# Patient Record
Sex: MALE | Race: WHITE
[De-identification: names, ages, dates, MRNs, and addresses within clinical notes are randomized per-mention and may not be internally consistent; named-entity substitution may affect disease eponyms.]

---

## 2020-11-11 ENCOUNTER — HOSPITAL ENCOUNTER (EMERGENCY)
Dept: HOSPITAL 94 - ER | Age: 80
Discharge: HOME | End: 2020-11-11
Payer: COMMERCIAL

## 2020-11-11 VITALS — SYSTOLIC BLOOD PRESSURE: 139 MMHG | DIASTOLIC BLOOD PRESSURE: 86 MMHG

## 2020-11-11 VITALS — WEIGHT: 190.39 LBS | HEIGHT: 68 IN | BODY MASS INDEX: 28.85 KG/M2

## 2020-11-11 DIAGNOSIS — N39.0: Primary | ICD-10-CM

## 2020-11-11 DIAGNOSIS — I25.10: ICD-10-CM

## 2020-11-11 DIAGNOSIS — I10: ICD-10-CM

## 2020-11-11 DIAGNOSIS — R53.1: ICD-10-CM

## 2020-11-11 DIAGNOSIS — Z79.899: ICD-10-CM

## 2020-11-11 DIAGNOSIS — Z88.0: ICD-10-CM

## 2020-11-11 DIAGNOSIS — Z95.1: ICD-10-CM

## 2020-11-11 DIAGNOSIS — Z85.46: ICD-10-CM

## 2020-11-11 LAB
ALBUMIN SERPL BCP-MCNC: 3.4 G/DL (ref 3.4–5)
ALBUMIN/GLOB SERPL: 0.9 {RATIO} (ref 1.1–1.5)
ALP SERPL-CCNC: 66 IU/L (ref 46–116)
ALT SERPL W P-5'-P-CCNC: 16 U/L (ref 12–78)
ANION GAP SERPL CALCULATED.3IONS-SCNC: 9 MMOL/L (ref 8–16)
AST SERPL W P-5'-P-CCNC: 10 U/L (ref 10–37)
BACTERIA URNS QL MICRO: (no result) /HPF
BASOPHILS # BLD AUTO: 0.1 X10'3 (ref 0–0.2)
BASOPHILS NFR BLD AUTO: 0.7 % (ref 0–1)
BILIRUB SERPL-MCNC: 1.2 MG/DL (ref 0.1–1)
BUN SERPL-MCNC: 16 MG/DL (ref 7–18)
BUN/CREAT SERPL: 9.6 (ref 5.4–32)
CALCIUM SERPL-MCNC: 8.8 MG/DL (ref 8.5–10.1)
CHLORIDE SERPL-SCNC: 104 MMOL/L (ref 99–107)
CLARITY UR: (no result)
CO2 SERPL-SCNC: 24.9 MMOL/L (ref 24–32)
COLOR UR: YELLOW
CREAT SERPL-MCNC: 1.67 MG/DL (ref 0.6–1.1)
DEPRECATED SQUAMOUS URNS QL MICRO: (no result) /LPF
EOSINOPHIL # BLD AUTO: 0 X10'3 (ref 0–0.9)
EOSINOPHIL NFR BLD AUTO: 0.1 % (ref 0–6)
ERYTHROCYTE [DISTWIDTH] IN BLOOD BY AUTOMATED COUNT: 13.8 % (ref 11.5–14.5)
GFR SERPL CREATININE-BSD FRML MDRD: 40 ML/MIN
GLUCOSE SERPL-MCNC: 139 MG/DL (ref 70–104)
GLUCOSE UR STRIP-MCNC: NEGATIVE MG/DL
HCT VFR BLD AUTO: 37.8 % (ref 42–52)
HGB BLD-MCNC: 12.7 G/DL (ref 14–17.9)
HGB UR QL STRIP: (no result)
KETONES UR STRIP-MCNC: NEGATIVE MG/DL
LEUKOCYTE ESTERASE UR QL STRIP: (no result)
LIPASE SERPL-CCNC: 73 U/L (ref 73–393)
LYMPHOCYTES # BLD AUTO: 0.7 X10'3 (ref 1.1–4.8)
LYMPHOCYTES NFR BLD AUTO: 5.7 % (ref 21–51)
MCH RBC QN AUTO: 30.6 PG (ref 27–31)
MCHC RBC AUTO-ENTMCNC: 33.7 G/DL (ref 33–36.5)
MCV RBC AUTO: 90.9 FL (ref 78–98)
MONOCYTES # BLD AUTO: 1.3 X10'3 (ref 0–0.9)
MONOCYTES NFR BLD AUTO: 11.4 % (ref 2–12)
MUCOUS THREADS URNS QL MICRO: (no result) /LPF
NEUTROPHILS # BLD AUTO: 9.4 X10'3 (ref 1.8–7.7)
NEUTROPHILS NFR BLD AUTO: 82.1 % (ref 42–75)
NITRITE UR QL STRIP: NEGATIVE
PH UR STRIP: 7 [PH] (ref 4.8–8)
PLATELET # BLD AUTO: 193 X10'3 (ref 140–440)
PMV BLD AUTO: 7.3 FL (ref 7.4–10.4)
POTASSIUM SERPL-SCNC: 4.1 MMOL/L (ref 3.5–5.1)
PROT SERPL-MCNC: 7.2 G/DL (ref 6.4–8.2)
PROT UR QL STRIP: 30 MG/DL
RBC # BLD AUTO: 4.16 X10'6 (ref 4.7–6.1)
RBC #/AREA URNS HPF: (no result) /HPF (ref 0–2)
SODIUM SERPL-SCNC: 138 MMOL/L (ref 135–145)
SP GR UR STRIP: 1.02 (ref 1–1.03)
TRANS CELLS URNS QL MICRO: (no result) /HPF
TROPONIN I SERPL-MCNC: < 0.04 NG/ML (ref 0–0.05)
URN COLLECT METHOD CLASS: (no result)
UROBILINOGEN UR STRIP-MCNC: 1 E.U/DL (ref 0.2–1)
WBC # BLD AUTO: 11.5 X10'3 (ref 4.5–11)

## 2020-11-11 PROCEDURE — 96361 HYDRATE IV INFUSION ADD-ON: CPT

## 2020-11-11 PROCEDURE — 87088 URINE BACTERIA CULTURE: CPT

## 2020-11-11 PROCEDURE — 83690 ASSAY OF LIPASE: CPT

## 2020-11-11 PROCEDURE — 87077 CULTURE AEROBIC IDENTIFY: CPT

## 2020-11-11 PROCEDURE — 83880 ASSAY OF NATRIURETIC PEPTIDE: CPT

## 2020-11-11 PROCEDURE — 93005 ELECTROCARDIOGRAM TRACING: CPT

## 2020-11-11 PROCEDURE — 84484 ASSAY OF TROPONIN QUANT: CPT

## 2020-11-11 PROCEDURE — 81001 URINALYSIS AUTO W/SCOPE: CPT

## 2020-11-11 PROCEDURE — 80053 COMPREHEN METABOLIC PANEL: CPT

## 2020-11-11 PROCEDURE — 96375 TX/PRO/DX INJ NEW DRUG ADDON: CPT

## 2020-11-11 PROCEDURE — 99285 EMERGENCY DEPT VISIT HI MDM: CPT

## 2020-11-11 PROCEDURE — 85025 COMPLETE CBC W/AUTO DIFF WBC: CPT

## 2020-11-11 PROCEDURE — 87186 SC STD MICRODIL/AGAR DIL: CPT

## 2020-11-11 PROCEDURE — 96365 THER/PROPH/DIAG IV INF INIT: CPT

## 2020-11-11 PROCEDURE — 36415 COLL VENOUS BLD VENIPUNCTURE: CPT

## 2020-11-11 PROCEDURE — 71045 X-RAY EXAM CHEST 1 VIEW: CPT

## 2021-08-17 ENCOUNTER — HOSPITAL ENCOUNTER (EMERGENCY)
Dept: HOSPITAL 94 - ER | Age: 81
Discharge: HOME | End: 2021-08-17
Payer: COMMERCIAL

## 2021-08-17 VITALS — HEIGHT: 68 IN | BODY MASS INDEX: 25.82 KG/M2 | WEIGHT: 170.35 LBS

## 2021-08-17 VITALS — SYSTOLIC BLOOD PRESSURE: 141 MMHG | DIASTOLIC BLOOD PRESSURE: 73 MMHG

## 2021-08-17 DIAGNOSIS — Z79.2: ICD-10-CM

## 2021-08-17 DIAGNOSIS — Z86.73: ICD-10-CM

## 2021-08-17 DIAGNOSIS — Z88.0: ICD-10-CM

## 2021-08-17 DIAGNOSIS — Z98.890: ICD-10-CM

## 2021-08-17 DIAGNOSIS — I25.10: ICD-10-CM
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
## 2021-08-17 DIAGNOSIS — R33.9: ICD-10-CM

## 2021-08-17 DIAGNOSIS — Z85.9: ICD-10-CM

## 2021-08-17 DIAGNOSIS — I10: ICD-10-CM

## 2021-08-17 DIAGNOSIS — Z79.899: ICD-10-CM

## 2021-08-17 DIAGNOSIS — T83.091A: Primary | ICD-10-CM

## 2021-08-17 DIAGNOSIS — Z87.440: ICD-10-CM

## 2021-08-17 LAB
ALBUMIN SERPL BCP-MCNC: 2.8 G/DL (ref 3.4–5)
ALBUMIN/GLOB SERPL: 0.7 {RATIO} (ref 1.1–1.5)
ALP SERPL-CCNC: 54 IU/L (ref 46–116)
ALT SERPL W P-5'-P-CCNC: 22 U/L (ref 12–78)
ANION GAP SERPL CALCULATED.3IONS-SCNC: 8 MMOL/L (ref 8–16)
AST SERPL W P-5'-P-CCNC: 15 U/L (ref 10–37)
BASOPHILS # BLD AUTO: 0 X10'3 (ref 0–0.2)
BASOPHILS NFR BLD AUTO: 0.6 % (ref 0–1)
BILIRUB SERPL-MCNC: 0.2 MG/DL (ref 0.1–1)
BUN SERPL-MCNC: 17 MG/DL (ref 7–18)
BUN/CREAT SERPL: 11.4 (ref 5.4–32)
CALCIUM SERPL-MCNC: 8.2 MG/DL (ref 8.5–10.1)
CHLORIDE SERPL-SCNC: 107 MMOL/L (ref 99–107)
CO2 SERPL-SCNC: 27.6 MMOL/L (ref 24–32)
CREAT SERPL-MCNC: 1.49 MG/DL (ref 0.6–1.1)
EOSINOPHIL # BLD AUTO: 0.1 X10'3 (ref 0–0.9)
EOSINOPHIL NFR BLD AUTO: 2.1 % (ref 0–6)
ERYTHROCYTE [DISTWIDTH] IN BLOOD BY AUTOMATED COUNT: 14.3 % (ref 11.5–14.5)
GFR SERPL CREATININE-BSD FRML MDRD: 45 ML/MIN
GLUCOSE SERPL-MCNC: 98 MG/DL (ref 70–104)
HCT VFR BLD AUTO: 32.1 % (ref 42–52)
HGB BLD-MCNC: 10.8 G/DL (ref 14–17.9)
LYMPHOCYTES # BLD AUTO: 0.9 X10'3 (ref 1.1–4.8)
LYMPHOCYTES NFR BLD AUTO: 12.5 % (ref 21–51)
MCH RBC QN AUTO: 30.1 PG (ref 27–31)
MCHC RBC AUTO-ENTMCNC: 33.7 G/DL (ref 33–36.5)
MCV RBC AUTO: 89.1 FL (ref 78–98)
MONOCYTES # BLD AUTO: 0.6 X10'3 (ref 0–0.9)
MONOCYTES NFR BLD AUTO: 9.1 % (ref 2–12)
NEUTROPHILS # BLD AUTO: 5.4 X10'3 (ref 1.8–7.7)
NEUTROPHILS NFR BLD AUTO: 75.7 % (ref 42–75)
PLATELET # BLD AUTO: 401 X10'3 (ref 140–440)
PMV BLD AUTO: 6.5 FL (ref 7.4–10.4)
POTASSIUM SERPL-SCNC: 3.6 MMOL/L (ref 3.5–5.1)
PROT SERPL-MCNC: 6.8 G/DL (ref 6.4–8.2)
RBC # BLD AUTO: 3.6 X10'6 (ref 4.7–6.1)
SODIUM SERPL-SCNC: 143 MMOL/L (ref 135–145)
WBC # BLD AUTO: 7.1 X10'3 (ref 4.5–11)

## 2021-08-17 PROCEDURE — 85025 COMPLETE CBC W/AUTO DIFF WBC: CPT

## 2021-08-17 PROCEDURE — 36415 COLL VENOUS BLD VENIPUNCTURE: CPT

## 2021-08-17 PROCEDURE — 99283 EMERGENCY DEPT VISIT LOW MDM: CPT

## 2021-08-17 PROCEDURE — 80053 COMPREHEN METABOLIC PANEL: CPT

## 2021-08-17 NOTE — NUR
catheter replaced. pt arrives with catheter tubing cut with scissors. pt states 
the bag was full so he cut the tubing. pt educated on proper catheter care.

## 2023-02-13 ENCOUNTER — HOSPITAL ENCOUNTER (INPATIENT)
Dept: HOSPITAL 94 - ER | Age: 83
LOS: 13 days | DRG: 64 | End: 2023-02-26
Attending: FAMILY MEDICINE | Admitting: FAMILY MEDICINE
Payer: COMMERCIAL

## 2023-02-13 VITALS — BODY MASS INDEX: 23.01 KG/M2 | HEIGHT: 70 IN | WEIGHT: 160.72 LBS

## 2023-02-13 DIAGNOSIS — D64.9: ICD-10-CM

## 2023-02-13 DIAGNOSIS — E87.0: ICD-10-CM

## 2023-02-13 DIAGNOSIS — I63.89: Primary | ICD-10-CM

## 2023-02-13 DIAGNOSIS — N39.0: ICD-10-CM

## 2023-02-13 DIAGNOSIS — N40.0: ICD-10-CM

## 2023-02-13 DIAGNOSIS — E78.00: ICD-10-CM

## 2023-02-13 DIAGNOSIS — Z66: ICD-10-CM

## 2023-02-13 DIAGNOSIS — Z51.5: ICD-10-CM

## 2023-02-13 DIAGNOSIS — Z80.9: ICD-10-CM

## 2023-02-13 DIAGNOSIS — I13.0: ICD-10-CM

## 2023-02-13 DIAGNOSIS — F02.A0: ICD-10-CM

## 2023-02-13 DIAGNOSIS — I50.32: ICD-10-CM

## 2023-02-13 DIAGNOSIS — N17.9: ICD-10-CM

## 2023-02-13 DIAGNOSIS — R13.10: ICD-10-CM

## 2023-02-13 DIAGNOSIS — Z74.01: ICD-10-CM

## 2023-02-13 DIAGNOSIS — Z86.73: ICD-10-CM

## 2023-02-13 DIAGNOSIS — R29.710: ICD-10-CM

## 2023-02-13 DIAGNOSIS — N18.9: ICD-10-CM

## 2023-02-13 DIAGNOSIS — Z79.02: ICD-10-CM

## 2023-02-13 DIAGNOSIS — Z82.0: ICD-10-CM

## 2023-02-13 DIAGNOSIS — G30.9: ICD-10-CM

## 2023-02-13 DIAGNOSIS — I25.10: ICD-10-CM

## 2023-02-13 DIAGNOSIS — R15.9: ICD-10-CM

## 2023-02-13 DIAGNOSIS — Z95.1: ICD-10-CM

## 2023-02-13 DIAGNOSIS — G93.41: ICD-10-CM

## 2023-02-13 DIAGNOSIS — Z88.0: ICD-10-CM

## 2023-02-13 DIAGNOSIS — J18.9: ICD-10-CM

## 2023-02-13 DIAGNOSIS — Z79.899: ICD-10-CM

## 2023-02-13 LAB
ALBUMIN SERPL BCP-MCNC: 3.3 G/DL (ref 3.4–5)
ALBUMIN/GLOB SERPL: 0.7 {RATIO} (ref 1.1–1.5)
ALP SERPL-CCNC: 102 IU/L (ref 46–116)
ALT SERPL W P-5'-P-CCNC: 37 U/L (ref 12–78)
AMMONIA PLAS-SCNC: 11 UMOL/L (ref 11–32)
ANION GAP SERPL CALCULATED.3IONS-SCNC: 15 MMOL/L (ref 8–16)
APTT PPP: 27 SECONDS (ref 22–32)
AST SERPL W P-5'-P-CCNC: 64 U/L (ref 10–37)
BASOPHILS # BLD AUTO: 0 X10'3 (ref 0–0.2)
BASOPHILS # BLD AUTO: 0 X10'3 (ref 0–0.2)
BASOPHILS NFR BLD AUTO: 0.2 % (ref 0–1)
BASOPHILS NFR BLD AUTO: 0.3 % (ref 0–1)
BILIRUB SERPL-MCNC: 0.7 MG/DL (ref 0.1–1)
BUN SERPL-MCNC: 30 MG/DL (ref 7–18)
BUN/CREAT SERPL: 21.7 (ref 5.4–32)
CALCIUM SERPL-MCNC: 9 MG/DL (ref 8.5–10.1)
CHLORIDE SERPL-SCNC: 108 MMOL/L (ref 99–107)
CO2 SERPL-SCNC: 21.1 MMOL/L (ref 24–32)
CREAT SERPL-MCNC: 1.38 MG/DL (ref 0.6–1.1)
EOSINOPHIL # BLD AUTO: 0.1 X10'3 (ref 0–0.9)
EOSINOPHIL # BLD AUTO: 0.1 X10'3 (ref 0–0.9)
EOSINOPHIL NFR BLD AUTO: 0.6 % (ref 0–6)
EOSINOPHIL NFR BLD AUTO: 0.6 % (ref 0–6)
ERYTHROCYTE [DISTWIDTH] IN BLOOD BY AUTOMATED COUNT: 13.4 % (ref 11.5–14.5)
ERYTHROCYTE [DISTWIDTH] IN BLOOD BY AUTOMATED COUNT: 13.9 % (ref 11.5–14.5)
GFR SERPL CREATININE-BSD FRML MDRD: 50 ML/MIN
GLUCOSE SERPL-MCNC: 94 MG/DL (ref 70–104)
HCT VFR BLD AUTO: 33.7 % (ref 42–52)
HCT VFR BLD AUTO: 38.1 % (ref 42–52)
HGB BLD-MCNC: 11.1 G/DL (ref 14–17.9)
HGB BLD-MCNC: 12.2 G/DL (ref 14–17.9)
LACTATE PLASV-SCNC: 1.4 MMOL/L (ref 0.4–2)
LYMPHOCYTES # BLD AUTO: 0.7 X10'3 (ref 1.1–4.8)
LYMPHOCYTES # BLD AUTO: 0.8 X10'3 (ref 1.1–4.8)
LYMPHOCYTES NFR BLD AUTO: 7.6 % (ref 21–51)
LYMPHOCYTES NFR BLD AUTO: 8.9 % (ref 21–51)
MCH RBC QN AUTO: 28.9 PG (ref 27–31)
MCH RBC QN AUTO: 29.2 PG (ref 27–31)
MCHC RBC AUTO-ENTMCNC: 32.1 G/DL (ref 33–36.5)
MCHC RBC AUTO-ENTMCNC: 33.1 G/DL (ref 33–36.5)
MCV RBC AUTO: 88.1 FL (ref 78–98)
MCV RBC AUTO: 90.1 FL (ref 78–98)
MONOCYTES # BLD AUTO: 0.9 X10'3 (ref 0–0.9)
MONOCYTES # BLD AUTO: 1 X10'3 (ref 0–0.9)
MONOCYTES NFR BLD AUTO: 10.5 % (ref 2–12)
MONOCYTES NFR BLD AUTO: 9.6 % (ref 2–12)
NEUTROPHILS # BLD AUTO: 7.4 X10'3 (ref 1.8–7.7)
NEUTROPHILS # BLD AUTO: 7.7 X10'3 (ref 1.8–7.7)
NEUTROPHILS NFR BLD AUTO: 79.8 % (ref 42–75)
NEUTROPHILS NFR BLD AUTO: 81.9 % (ref 42–75)
PLATELET # BLD AUTO: 383 X10'3 (ref 140–440)
PLATELET # BLD AUTO: 397 X10'3 (ref 140–440)
PMV BLD AUTO: 7 FL (ref 7.4–10.4)
PMV BLD AUTO: 7.1 FL (ref 7.4–10.4)
POTASSIUM SERPL-SCNC: 4 MMOL/L (ref 3.5–5.1)
PROT SERPL-MCNC: 8 G/DL (ref 6.4–8.2)
RBC # BLD AUTO: 3.82 X10'6 (ref 4.7–6.1)
RBC # BLD AUTO: 4.23 X10'6 (ref 4.7–6.1)
SODIUM SERPL-SCNC: 144 MMOL/L (ref 135–145)
WBC # BLD AUTO: 9.2 X10'3 (ref 4.5–11)
WBC # BLD AUTO: 9.4 X10'3 (ref 4.5–11)

## 2023-02-13 PROCEDURE — 82948 REAGENT STRIP/BLOOD GLUCOSE: CPT

## 2023-02-13 PROCEDURE — 81001 URINALYSIS AUTO W/SCOPE: CPT

## 2023-02-13 PROCEDURE — 93308 TTE F-UP OR LMTD: CPT

## 2023-02-13 PROCEDURE — 70450 CT HEAD/BRAIN W/O DYE: CPT

## 2023-02-13 PROCEDURE — 87081 CULTURE SCREEN ONLY: CPT

## 2023-02-13 PROCEDURE — 83880 ASSAY OF NATRIURETIC PEPTIDE: CPT

## 2023-02-13 PROCEDURE — 36415 COLL VENOUS BLD VENIPUNCTURE: CPT

## 2023-02-13 PROCEDURE — 96365 THER/PROPH/DIAG IV INF INIT: CPT

## 2023-02-13 PROCEDURE — 85610 PROTHROMBIN TIME: CPT

## 2023-02-13 PROCEDURE — 74230 X-RAY XM SWLNG FUNCJ C+: CPT

## 2023-02-13 PROCEDURE — 92508 TX SP LANG VOICE COMM GROUP: CPT

## 2023-02-13 PROCEDURE — 83036 HEMOGLOBIN GLYCOSYLATED A1C: CPT

## 2023-02-13 PROCEDURE — 85730 THROMBOPLASTIN TIME PARTIAL: CPT

## 2023-02-13 PROCEDURE — 85025 COMPLETE CBC W/AUTO DIFF WBC: CPT

## 2023-02-13 PROCEDURE — 70551 MRI BRAIN STEM W/O DYE: CPT

## 2023-02-13 PROCEDURE — 82607 VITAMIN B-12: CPT

## 2023-02-13 PROCEDURE — 84100 ASSAY OF PHOSPHORUS: CPT

## 2023-02-13 PROCEDURE — 96366 THER/PROPH/DIAG IV INF ADDON: CPT

## 2023-02-13 PROCEDURE — 84443 ASSAY THYROID STIM HORMONE: CPT

## 2023-02-13 PROCEDURE — 96361 HYDRATE IV INFUSION ADD-ON: CPT

## 2023-02-13 PROCEDURE — 87088 URINE BACTERIA CULTURE: CPT

## 2023-02-13 PROCEDURE — 99285 EMERGENCY DEPT VISIT HI MDM: CPT

## 2023-02-13 PROCEDURE — 70544 MR ANGIOGRAPHY HEAD W/O DYE: CPT

## 2023-02-13 PROCEDURE — 94760 N-INVAS EAR/PLS OXIMETRY 1: CPT

## 2023-02-13 PROCEDURE — 97161 PT EVAL LOW COMPLEX 20 MIN: CPT

## 2023-02-13 PROCEDURE — 97535 SELF CARE MNGMENT TRAINING: CPT

## 2023-02-13 PROCEDURE — 93882 EXTRACRANIAL UNI/LTD STUDY: CPT

## 2023-02-13 PROCEDURE — 82140 ASSAY OF AMMONIA: CPT

## 2023-02-13 PROCEDURE — 83735 ASSAY OF MAGNESIUM: CPT

## 2023-02-13 PROCEDURE — 83605 ASSAY OF LACTIC ACID: CPT

## 2023-02-13 PROCEDURE — 80053 COMPREHEN METABOLIC PANEL: CPT

## 2023-02-13 PROCEDURE — 93005 ELECTROCARDIOGRAM TRACING: CPT

## 2023-02-13 PROCEDURE — 80061 LIPID PANEL: CPT

## 2023-02-13 PROCEDURE — 87040 BLOOD CULTURE FOR BACTERIA: CPT

## 2023-02-13 PROCEDURE — 84484 ASSAY OF TROPONIN QUANT: CPT

## 2023-02-13 PROCEDURE — 97530 THERAPEUTIC ACTIVITIES: CPT

## 2023-02-13 PROCEDURE — 71045 X-RAY EXAM CHEST 1 VIEW: CPT

## 2023-02-13 NOTE — NUR
Recieved report from off-going LVN regarding multiple unsuccessful attempts to 
cath patient for urine. Oncoming nurse spoke with Dr. Bro, advised to 
bladder scan and try one more time, if resistance met, rediscuss with her. 



Patient had 65ml in bladder, has been incontinent of bowel and urine all day, 
attempt to cath with 14fr, restinance met, procedure stopped. Dieudonne made 
aware, urology to be consulted.

## 2023-02-14 VITALS — DIASTOLIC BLOOD PRESSURE: 72 MMHG | SYSTOLIC BLOOD PRESSURE: 145 MMHG

## 2023-02-14 VITALS — DIASTOLIC BLOOD PRESSURE: 45 MMHG | SYSTOLIC BLOOD PRESSURE: 108 MMHG

## 2023-02-14 VITALS — SYSTOLIC BLOOD PRESSURE: 150 MMHG | DIASTOLIC BLOOD PRESSURE: 72 MMHG

## 2023-02-14 VITALS — SYSTOLIC BLOOD PRESSURE: 113 MMHG | DIASTOLIC BLOOD PRESSURE: 57 MMHG

## 2023-02-14 VITALS — DIASTOLIC BLOOD PRESSURE: 59 MMHG | SYSTOLIC BLOOD PRESSURE: 142 MMHG

## 2023-02-14 VITALS — SYSTOLIC BLOOD PRESSURE: 165 MMHG | DIASTOLIC BLOOD PRESSURE: 79 MMHG

## 2023-02-14 LAB
APTT PPP: 27 SECONDS (ref 22–32)
BACTERIA URNS QL MICRO: (no result) /HPF
CLARITY UR: (no result)
COLOR UR: (no result)
DEPRECATED SQUAMOUS URNS QL MICRO: (no result) /LPF
GLUCOSE UR STRIP-MCNC: NEGATIVE MG/DL
HBA1C MFR BLD: 5.2 % (ref 4.5–6.2)
HGB UR QL STRIP: (no result)
HYALINE CASTS URNS QL MICRO: (no result) /LPF
KETONES UR STRIP-MCNC: 40 MG/DL
LEUKOCYTE ESTERASE UR QL STRIP: NEGATIVE
MAGNESIUM SERPL-MCNC: 2.2 MG/DL (ref 1.5–2.4)
MUCOUS THREADS URNS QL MICRO: (no result) /LPF
NITRITE UR QL STRIP: NEGATIVE
PH UR STRIP: 6 [PH] (ref 4.8–8)
PHOSPHATE SERPL-MCNC: 3.7 MG/DL (ref 2.3–4.5)
PROT UR QL STRIP: (no result) MG/DL
RBC #/AREA URNS HPF: (no result) /HPF (ref 0–2)
SP GR UR STRIP: >=1.03 (ref 1–1.03)
TRANS CELLS URNS QL MICRO: (no result) /HPF
URN COLLECT METHOD CLASS: (no result)
UROBILINOGEN UR STRIP-MCNC: 0.2 E.U/DL (ref 0.2–1)
WBC #/AREA URNS HPF: (no result) /HPF (ref 0–4)

## 2023-02-14 RX ADMIN — PANTOPRAZOLE SODIUM SCH MG: 40 TABLET, DELAYED RELEASE ORAL at 14:56

## 2023-02-14 RX ADMIN — Medication SCH MG: at 14:55

## 2023-02-14 RX ADMIN — HEPARIN SODIUM SCH UNIT: 5000 INJECTION, SOLUTION INTRAVENOUS; SUBCUTANEOUS at 21:00

## 2023-02-14 RX ADMIN — DOCUSATE SODIUM SCH MG: 100 CAPSULE, LIQUID FILLED ORAL at 08:00

## 2023-02-14 RX ADMIN — LEVOFLOXACIN SCH MLS/HR: 500 INJECTION, SOLUTION INTRAVENOUS at 20:59

## 2023-02-14 RX ADMIN — SODIUM CHLORIDE SCH MLS/HR: 9 INJECTION INTRAMUSCULAR; INTRAVENOUS; SUBCUTANEOUS at 11:10

## 2023-02-14 RX ADMIN — SODIUM CHLORIDE SCH MLS/HR: 9 INJECTION INTRAMUSCULAR; INTRAVENOUS; SUBCUTANEOUS at 01:10

## 2023-02-14 RX ADMIN — DOCUSATE SODIUM SCH MG: 100 CAPSULE, LIQUID FILLED ORAL at 20:00

## 2023-02-14 NOTE — NUR
AROUSABLE TO REPEATEDLY CALLING NAME AND LIGHT TOUCH TO RESPOND AND OPEN EYES. HORIZONTAL 
CONSTANT NYSTAGMUS IS PRESENT. DIFFICULT TO DETERMINE VISION STATUS. APHASIA PRESENT, UNABLE 
TO NAME COMMON ITEMS, EX PEN OR CELL PHONE. GENERALLY IN WEAKENED CONDITION. NOT ORIENTED TO 
MONTH OR CURRENT AGE. UNABLE TO HOLD LEGS UP FOR 5 SECONDS. ARMS ARE WEAK BUT ABLE TO HOLD 
POSITION. SENSATION INTACT. MRI PENDING.

## 2023-02-14 NOTE — NUR
Message: [id number] Rivas Cuellar Wife just home from fx hip. Request DNR for pt. He has 
functional decline. She can not care for him right now. Yanni [phone]

## 2023-02-15 VITALS — DIASTOLIC BLOOD PRESSURE: 76 MMHG | SYSTOLIC BLOOD PRESSURE: 156 MMHG

## 2023-02-15 VITALS — SYSTOLIC BLOOD PRESSURE: 124 MMHG | DIASTOLIC BLOOD PRESSURE: 64 MMHG

## 2023-02-15 VITALS — SYSTOLIC BLOOD PRESSURE: 164 MMHG | DIASTOLIC BLOOD PRESSURE: 77 MMHG

## 2023-02-15 LAB
ALBUMIN SERPL BCP-MCNC: 2.5 G/DL (ref 3.4–5)
ALBUMIN/GLOB SERPL: 0.7 {RATIO} (ref 1.1–1.5)
ALP SERPL-CCNC: 80 IU/L (ref 46–116)
ALT SERPL W P-5'-P-CCNC: 33 U/L (ref 12–78)
ANION GAP SERPL CALCULATED.3IONS-SCNC: 11 MMOL/L (ref 8–16)
AST SERPL W P-5'-P-CCNC: 41 U/L (ref 10–37)
BASOPHILS # BLD AUTO: 0 X10'3 (ref 0–0.2)
BASOPHILS NFR BLD AUTO: 0.3 % (ref 0–1)
BILIRUB SERPL-MCNC: 0.4 MG/DL (ref 0.1–1)
BUN SERPL-MCNC: 21 MG/DL (ref 7–18)
BUN/CREAT SERPL: 17.1 (ref 5.4–32)
CALCIUM SERPL-MCNC: 8.5 MG/DL (ref 8.5–10.1)
CHLORIDE SERPL-SCNC: 112 MMOL/L (ref 99–107)
CHOLEST SERPL-MCNC: 143 MG/DL (ref 0–200)
CHOLEST/HDLC SERPL: 4.6 {RATIO} (ref 0–4.99)
CO2 SERPL-SCNC: 23 MMOL/L (ref 24–32)
CREAT SERPL-MCNC: 1.23 MG/DL (ref 0.6–1.1)
EOSINOPHIL # BLD AUTO: 0.1 X10'3 (ref 0–0.9)
EOSINOPHIL NFR BLD AUTO: 2.1 % (ref 0–6)
ERYTHROCYTE [DISTWIDTH] IN BLOOD BY AUTOMATED COUNT: 13.4 % (ref 11.5–14.5)
GFR SERPL CREATININE-BSD FRML MDRD: 57 ML/MIN
GLUCOSE SERPL-MCNC: 95 MG/DL (ref 70–104)
HCT VFR BLD AUTO: 29.3 % (ref 42–52)
HDLC SERPL-MCNC: 31 MG/DL (ref 35–60)
HGB BLD-MCNC: 9.7 G/DL (ref 14–17.9)
LDLC SERPL DIRECT ASSAY-MCNC: 87 MG/DL (ref 50–100)
LYMPHOCYTES # BLD AUTO: 0.7 X10'3 (ref 1.1–4.8)
LYMPHOCYTES NFR BLD AUTO: 10.3 % (ref 21–51)
MAGNESIUM SERPL-MCNC: 1.9 MG/DL (ref 1.5–2.4)
MCH RBC QN AUTO: 29.2 PG (ref 27–31)
MCHC RBC AUTO-ENTMCNC: 33.2 G/DL (ref 33–36.5)
MCV RBC AUTO: 88 FL (ref 78–98)
MONOCYTES # BLD AUTO: 0.7 X10'3 (ref 0–0.9)
MONOCYTES NFR BLD AUTO: 10.4 % (ref 2–12)
NEUTROPHILS # BLD AUTO: 5 X10'3 (ref 1.8–7.7)
NEUTROPHILS NFR BLD AUTO: 76.9 % (ref 42–75)
PHOSPHATE SERPL-MCNC: 2.4 MG/DL (ref 2.3–4.5)
PLATELET # BLD AUTO: 310 X10'3 (ref 140–440)
PMV BLD AUTO: 7.3 FL (ref 7.4–10.4)
POTASSIUM SERPL-SCNC: 3.4 MMOL/L (ref 3.5–5.1)
PROT SERPL-MCNC: 5.9 G/DL (ref 6.4–8.2)
RBC # BLD AUTO: 3.34 X10'6 (ref 4.7–6.1)
SODIUM SERPL-SCNC: 146 MMOL/L (ref 135–145)
TRIGL SERPL-MCNC: 105 MG/DL (ref 20–135)
WBC # BLD AUTO: 6.5 X10'3 (ref 4.5–11)

## 2023-02-15 RX ADMIN — POTASSIUM CHLORIDE PRN MEQ: 1500 TABLET, EXTENDED RELEASE ORAL at 10:11

## 2023-02-15 RX ADMIN — HEPARIN SODIUM SCH UNIT: 5000 INJECTION, SOLUTION INTRAVENOUS; SUBCUTANEOUS at 20:13

## 2023-02-15 RX ADMIN — MEMANTINE HYDROCHLORIDE SCH MG: 7 CAPSULE, EXTENDED RELEASE ORAL at 10:10

## 2023-02-15 RX ADMIN — POTASSIUM CHLORIDE PRN MEQ: 1500 TABLET, EXTENDED RELEASE ORAL at 20:09

## 2023-02-15 RX ADMIN — DOCUSATE SODIUM SCH MG: 100 CAPSULE, LIQUID FILLED ORAL at 19:09

## 2023-02-15 RX ADMIN — LEVOFLOXACIN SCH MLS/HR: 500 INJECTION, SOLUTION INTRAVENOUS at 17:55

## 2023-02-15 RX ADMIN — POTASSIUM CHLORIDE PRN MEQ: 1500 TABLET, EXTENDED RELEASE ORAL at 14:47

## 2023-02-15 RX ADMIN — SODIUM CHLORIDE SCH MLS/HR: 9 INJECTION INTRAMUSCULAR; INTRAVENOUS; SUBCUTANEOUS at 07:10

## 2023-02-15 RX ADMIN — HEPARIN SODIUM SCH UNIT: 5000 INJECTION, SOLUTION INTRAVENOUS; SUBCUTANEOUS at 09:39

## 2023-02-15 RX ADMIN — SODIUM CHLORIDE SCH MLS/HR: 9 INJECTION INTRAMUSCULAR; INTRAVENOUS; SUBCUTANEOUS at 01:40

## 2023-02-15 RX ADMIN — MEMANTINE HYDROCHLORIDE SCH MG: 7 CAPSULE, EXTENDED RELEASE ORAL at 08:00

## 2023-02-15 RX ADMIN — PANTOPRAZOLE SODIUM SCH MG: 40 TABLET, DELAYED RELEASE ORAL at 09:37

## 2023-02-15 RX ADMIN — Medication SCH MG: at 09:38

## 2023-02-15 RX ADMIN — DOCUSATE SODIUM SCH MG: 100 CAPSULE, LIQUID FILLED ORAL at 09:38

## 2023-02-15 NOTE — NUR
Student documentation:

I have reviewed and agree with all interventions, assessments performed and documented by 
RANDOLPH MORSE.

## 2023-02-15 NOTE — NUR
Student documentation:

I have reviewed interventions, assessments performed and documented by Hermelinda [facility].

## 2023-02-16 VITALS — SYSTOLIC BLOOD PRESSURE: 148 MMHG | DIASTOLIC BLOOD PRESSURE: 91 MMHG

## 2023-02-16 VITALS — SYSTOLIC BLOOD PRESSURE: 143 MMHG | DIASTOLIC BLOOD PRESSURE: 62 MMHG

## 2023-02-16 VITALS — SYSTOLIC BLOOD PRESSURE: 169 MMHG | DIASTOLIC BLOOD PRESSURE: 91 MMHG

## 2023-02-16 VITALS — DIASTOLIC BLOOD PRESSURE: 62 MMHG | SYSTOLIC BLOOD PRESSURE: 143 MMHG

## 2023-02-16 VITALS — DIASTOLIC BLOOD PRESSURE: 62 MMHG | SYSTOLIC BLOOD PRESSURE: 139 MMHG

## 2023-02-16 LAB
ALBUMIN SERPL BCP-MCNC: 2.1 G/DL (ref 3.4–5)
ALBUMIN/GLOB SERPL: 0.7 {RATIO} (ref 1.1–1.5)
ALP SERPL-CCNC: 71 IU/L (ref 46–116)
ALT SERPL W P-5'-P-CCNC: 25 U/L (ref 12–78)
ANION GAP SERPL CALCULATED.3IONS-SCNC: 9 MMOL/L (ref 8–16)
AST SERPL W P-5'-P-CCNC: 23 U/L (ref 10–37)
BASOPHILS # BLD AUTO: 0 X10'3 (ref 0–0.2)
BASOPHILS NFR BLD AUTO: 0.4 % (ref 0–1)
BILIRUB SERPL-MCNC: 0.2 MG/DL (ref 0.1–1)
BUN SERPL-MCNC: 18 MG/DL (ref 7–18)
BUN/CREAT SERPL: 16.5 (ref 5.4–32)
CALCIUM SERPL-MCNC: 8.3 MG/DL (ref 8.5–10.1)
CHLORIDE SERPL-SCNC: 114 MMOL/L (ref 99–107)
CO2 SERPL-SCNC: 21.8 MMOL/L (ref 24–32)
CREAT SERPL-MCNC: 1.09 MG/DL (ref 0.6–1.1)
EOSINOPHIL # BLD AUTO: 0.1 X10'3 (ref 0–0.9)
EOSINOPHIL NFR BLD AUTO: 3 % (ref 0–6)
ERYTHROCYTE [DISTWIDTH] IN BLOOD BY AUTOMATED COUNT: 13.6 % (ref 11.5–14.5)
GFR SERPL CREATININE-BSD FRML MDRD: 65 ML/MIN
GLUCOSE SERPL-MCNC: 103 MG/DL (ref 70–104)
HCT VFR BLD AUTO: 26.3 % (ref 42–52)
HGB BLD-MCNC: 8.8 G/DL (ref 14–17.9)
LYMPHOCYTES # BLD AUTO: 0.8 X10'3 (ref 1.1–4.8)
LYMPHOCYTES NFR BLD AUTO: 16.7 % (ref 21–51)
MAGNESIUM SERPL-MCNC: 1.7 MG/DL (ref 1.5–2.4)
MCH RBC QN AUTO: 29.5 PG (ref 27–31)
MCHC RBC AUTO-ENTMCNC: 33.5 G/DL (ref 33–36.5)
MCV RBC AUTO: 88.1 FL (ref 78–98)
MONOCYTES # BLD AUTO: 0.4 X10'3 (ref 0–0.9)
MONOCYTES NFR BLD AUTO: 9 % (ref 2–12)
NEUTROPHILS # BLD AUTO: 3.3 X10'3 (ref 1.8–7.7)
NEUTROPHILS NFR BLD AUTO: 70.9 % (ref 42–75)
PHOSPHATE SERPL-MCNC: 2.9 MG/DL (ref 2.3–4.5)
PLATELET # BLD AUTO: 253 X10'3 (ref 140–440)
PMV BLD AUTO: 6.8 FL (ref 7.4–10.4)
POTASSIUM SERPL-SCNC: 3.6 MMOL/L (ref 3.5–5.1)
PROT SERPL-MCNC: 5.2 G/DL (ref 6.4–8.2)
RBC # BLD AUTO: 2.99 X10'6 (ref 4.7–6.1)
SODIUM SERPL-SCNC: 145 MMOL/L (ref 135–145)
WBC # BLD AUTO: 4.6 X10'3 (ref 4.5–11)

## 2023-02-16 RX ADMIN — DOCUSATE SODIUM SCH MG: 100 CAPSULE, LIQUID FILLED ORAL at 20:00

## 2023-02-16 RX ADMIN — Medication SCH TAB: at 08:00

## 2023-02-16 RX ADMIN — HEPARIN SODIUM SCH UNIT: 5000 INJECTION, SOLUTION INTRAVENOUS; SUBCUTANEOUS at 20:41

## 2023-02-16 RX ADMIN — SODIUM CHLORIDE SCH MLS/HR: 9 INJECTION INTRAMUSCULAR; INTRAVENOUS; SUBCUTANEOUS at 09:15

## 2023-02-16 RX ADMIN — LEVOFLOXACIN SCH MLS/HR: 500 INJECTION, SOLUTION INTRAVENOUS at 17:59

## 2023-02-16 RX ADMIN — Medication SCH MG: at 08:00

## 2023-02-16 RX ADMIN — HEPARIN SODIUM SCH UNIT: 5000 INJECTION, SOLUTION INTRAVENOUS; SUBCUTANEOUS at 08:00

## 2023-02-16 RX ADMIN — SODIUM CHLORIDE SCH MLS/HR: 9 INJECTION INTRAMUSCULAR; INTRAVENOUS; SUBCUTANEOUS at 20:42

## 2023-02-16 RX ADMIN — SODIUM CHLORIDE SCH MLS/HR: 9 INJECTION INTRAMUSCULAR; INTRAVENOUS; SUBCUTANEOUS at 03:38

## 2023-02-16 RX ADMIN — PANTOPRAZOLE SODIUM SCH MG: 40 TABLET, DELAYED RELEASE ORAL at 07:30

## 2023-02-16 RX ADMIN — MEMANTINE HYDROCHLORIDE SCH MG: 7 CAPSULE, EXTENDED RELEASE ORAL at 08:00

## 2023-02-16 RX ADMIN — DOCUSATE SODIUM SCH MG: 100 CAPSULE, LIQUID FILLED ORAL at 08:00

## 2023-02-16 NOTE — NUR
Problems reprioritized. Patient report given, questions answered & plan of care reviewed 
with dhaval MORSE.

## 2023-02-16 NOTE — NUR
Patient in room [facility]. I have received report from LITO Stinson and had the opportunity to 
ask questions and assume patient care.

## 2023-02-17 VITALS — DIASTOLIC BLOOD PRESSURE: 59 MMHG | SYSTOLIC BLOOD PRESSURE: 100 MMHG

## 2023-02-17 VITALS — DIASTOLIC BLOOD PRESSURE: 80 MMHG | SYSTOLIC BLOOD PRESSURE: 151 MMHG

## 2023-02-17 VITALS — DIASTOLIC BLOOD PRESSURE: 76 MMHG | SYSTOLIC BLOOD PRESSURE: 148 MMHG

## 2023-02-17 VITALS — SYSTOLIC BLOOD PRESSURE: 101 MMHG | DIASTOLIC BLOOD PRESSURE: 54 MMHG

## 2023-02-17 LAB
ALBUMIN SERPL BCP-MCNC: 2.6 G/DL (ref 3.4–5)
ALBUMIN/GLOB SERPL: 0.7 {RATIO} (ref 1.1–1.5)
ALP SERPL-CCNC: 88 IU/L (ref 46–116)
ALT SERPL W P-5'-P-CCNC: 28 U/L (ref 12–78)
ANION GAP SERPL CALCULATED.3IONS-SCNC: 9 MMOL/L (ref 8–16)
AST SERPL W P-5'-P-CCNC: 27 U/L (ref 10–37)
BASOPHILS # BLD AUTO: 0 X10'3 (ref 0–0.2)
BASOPHILS NFR BLD AUTO: 0.4 % (ref 0–1)
BILIRUB SERPL-MCNC: 0.3 MG/DL (ref 0.1–1)
BUN SERPL-MCNC: 15 MG/DL (ref 7–18)
BUN/CREAT SERPL: 13.5 (ref 5.4–32)
CALCIUM SERPL-MCNC: 8.5 MG/DL (ref 8.5–10.1)
CHLORIDE SERPL-SCNC: 108 MMOL/L (ref 99–107)
CO2 SERPL-SCNC: 23.2 MMOL/L (ref 24–32)
CREAT SERPL-MCNC: 1.11 MG/DL (ref 0.6–1.1)
EOSINOPHIL # BLD AUTO: 0.1 X10'3 (ref 0–0.9)
EOSINOPHIL NFR BLD AUTO: 1.5 % (ref 0–6)
ERYTHROCYTE [DISTWIDTH] IN BLOOD BY AUTOMATED COUNT: 13.3 % (ref 11.5–14.5)
GFR SERPL CREATININE-BSD FRML MDRD: 64 ML/MIN
GLUCOSE SERPL-MCNC: 93 MG/DL (ref 70–104)
HCT VFR BLD AUTO: 31.7 % (ref 42–52)
HGB BLD-MCNC: 10.7 G/DL (ref 14–17.9)
LYMPHOCYTES # BLD AUTO: 0.7 X10'3 (ref 1.1–4.8)
LYMPHOCYTES NFR BLD AUTO: 11.1 % (ref 21–51)
MAGNESIUM SERPL-MCNC: 1.7 MG/DL (ref 1.5–2.4)
MCH RBC QN AUTO: 29.4 PG (ref 27–31)
MCHC RBC AUTO-ENTMCNC: 33.6 G/DL (ref 33–36.5)
MCV RBC AUTO: 87.6 FL (ref 78–98)
MONOCYTES # BLD AUTO: 0.5 X10'3 (ref 0–0.9)
MONOCYTES NFR BLD AUTO: 8.5 % (ref 2–12)
NEUTROPHILS # BLD AUTO: 4.7 X10'3 (ref 1.8–7.7)
NEUTROPHILS NFR BLD AUTO: 78.5 % (ref 42–75)
PHOSPHATE SERPL-MCNC: 2.9 MG/DL (ref 2.3–4.5)
PLATELET # BLD AUTO: 290 X10'3 (ref 140–440)
PMV BLD AUTO: 7.4 FL (ref 7.4–10.4)
POTASSIUM SERPL-SCNC: 3.6 MMOL/L (ref 3.5–5.1)
PROT SERPL-MCNC: 6.1 G/DL (ref 6.4–8.2)
RBC # BLD AUTO: 3.62 X10'6 (ref 4.7–6.1)
SODIUM SERPL-SCNC: 140 MMOL/L (ref 135–145)
WBC # BLD AUTO: 6 X10'3 (ref 4.5–11)

## 2023-02-17 RX ADMIN — Medication SCH TAB: at 08:00

## 2023-02-17 RX ADMIN — MEMANTINE HYDROCHLORIDE SCH MG: 7 CAPSULE, EXTENDED RELEASE ORAL at 13:46

## 2023-02-17 RX ADMIN — LEVOFLOXACIN SCH MLS/HR: 500 INJECTION, SOLUTION INTRAVENOUS at 17:01

## 2023-02-17 RX ADMIN — DOCUSATE SODIUM SCH MG: 50 LIQUID ORAL at 19:36

## 2023-02-17 RX ADMIN — HEPARIN SODIUM SCH UNIT: 5000 INJECTION, SOLUTION INTRAVENOUS; SUBCUTANEOUS at 13:41

## 2023-02-17 RX ADMIN — PANTOPRAZOLE SODIUM SCH MG: 40 TABLET, DELAYED RELEASE ORAL at 13:46

## 2023-02-17 RX ADMIN — HEPARIN SODIUM SCH UNIT: 5000 INJECTION, SOLUTION INTRAVENOUS; SUBCUTANEOUS at 19:37

## 2023-02-17 RX ADMIN — SODIUM CHLORIDE SCH MLS/HR: 9 INJECTION INTRAMUSCULAR; INTRAVENOUS; SUBCUTANEOUS at 11:55

## 2023-02-17 RX ADMIN — DOCUSATE SODIUM SCH MG: 100 CAPSULE, LIQUID FILLED ORAL at 08:00

## 2023-02-17 RX ADMIN — Medication SCH MG: at 13:46

## 2023-02-17 NOTE — NUR
patient seen by sami speech pathologist is to remain NPO due to inability to swallow 
safely. DR Bateman notified. NG placed for meds. Soft restraints also applied to patient to 
stop patient from pulling IV and NG out. will continue to monitor

## 2023-02-17 NOTE — NUR
Problems reprioritized. Patient report given, questions answered & plan of care reviewed 
with LITO Cash.

## 2023-02-17 NOTE — NUR
TF consult: Pt admit for acute stroke, UTI, and encephalopathy with 

baseline advanced dementia. Pt initially on an MM5 diet and eating well, 

documented with average 81% PO intake. Pt s/p MBS 2/16 and f/u BSS 2/17 

with ST recs NPO. Orders in place for NGT placement, a sitter, and 

restraints. TF recs below placed in EMR using IBW for 68" as current wt 

isn't scaled and pt documented as 68" at all past admits. LBM 2/16. Will 

continue to follow closely.

Recommendations:

1) Continuous TF via NGT using Jevity 1.2 with 70 mL/hr goal rate to provide 1680 mL total 
volume/day, 2016 kcal, 93 g protein, and 1356 mL water

2) Additional 100 mL water flush Q4H; monitor serum Na

3) Prealbumin q Monday/Thursday

4) Daily scaled weights; monitor need to adjust recs once scaled weight

5) Advance to regular diet as medically indicated; continue NGTF until diet is advanced and 
adequate PO intake is assured

6) Routine bowel care

-------------------------------------------------------------------------------

Addendum: 02/17/23 at 1428 by Liliam Grant RD

-------------------------------------------------------------------------------

Amended: Links added.

## 2023-02-17 NOTE — NUR
Patient in room [facility]. I have received report from dhaval MORSE and had the opportunity to ask 
questions and assume patient care.

## 2023-02-17 NOTE — NUR
Dr Bateman requested another Tele/Neuro consult be arranged now that the MRI is completed, 
recommendations and POC to be addressed. MD asks that Tele/Neuro MD view the MRI as well.  
Kalyani consult was ordered in [facility] and LITO Cash notified.

## 2023-02-18 VITALS — DIASTOLIC BLOOD PRESSURE: 60 MMHG | SYSTOLIC BLOOD PRESSURE: 110 MMHG

## 2023-02-18 VITALS — SYSTOLIC BLOOD PRESSURE: 121 MMHG | DIASTOLIC BLOOD PRESSURE: 55 MMHG

## 2023-02-18 VITALS — SYSTOLIC BLOOD PRESSURE: 105 MMHG | DIASTOLIC BLOOD PRESSURE: 49 MMHG

## 2023-02-18 VITALS — DIASTOLIC BLOOD PRESSURE: 72 MMHG | SYSTOLIC BLOOD PRESSURE: 105 MMHG

## 2023-02-18 LAB
ALBUMIN SERPL BCP-MCNC: 2.3 G/DL (ref 3.4–5)
ALBUMIN/GLOB SERPL: 0.7 {RATIO} (ref 1.1–1.5)
ALP SERPL-CCNC: 78 IU/L (ref 46–116)
ALT SERPL W P-5'-P-CCNC: 19 U/L (ref 12–78)
ANION GAP SERPL CALCULATED.3IONS-SCNC: 8 MMOL/L (ref 8–16)
AST SERPL W P-5'-P-CCNC: 20 U/L (ref 10–37)
BASOPHILS # BLD AUTO: 0 X10'3 (ref 0–0.2)
BASOPHILS NFR BLD AUTO: 0.3 % (ref 0–1)
BILIRUB SERPL-MCNC: 0.2 MG/DL (ref 0.1–1)
BUN SERPL-MCNC: 17 MG/DL (ref 7–18)
BUN/CREAT SERPL: 14.9 (ref 5.4–32)
CALCIUM SERPL-MCNC: 8.3 MG/DL (ref 8.5–10.1)
CHLORIDE SERPL-SCNC: 107 MMOL/L (ref 99–107)
CO2 SERPL-SCNC: 23.5 MMOL/L (ref 24–32)
CREAT SERPL-MCNC: 1.14 MG/DL (ref 0.6–1.1)
EOSINOPHIL # BLD AUTO: 0.1 X10'3 (ref 0–0.9)
EOSINOPHIL NFR BLD AUTO: 1.8 % (ref 0–6)
ERYTHROCYTE [DISTWIDTH] IN BLOOD BY AUTOMATED COUNT: 13.5 % (ref 11.5–14.5)
GFR SERPL CREATININE-BSD FRML MDRD: 62 ML/MIN
GLUCOSE SERPL-MCNC: 130 MG/DL (ref 70–104)
HCT VFR BLD AUTO: 28.2 % (ref 42–52)
HGB BLD-MCNC: 9.5 G/DL (ref 14–17.9)
LYMPHOCYTES # BLD AUTO: 0.8 X10'3 (ref 1.1–4.8)
LYMPHOCYTES NFR BLD AUTO: 13.9 % (ref 21–51)
MAGNESIUM SERPL-MCNC: 1.7 MG/DL (ref 1.5–2.4)
MCH RBC QN AUTO: 29.1 PG (ref 27–31)
MCHC RBC AUTO-ENTMCNC: 33.5 G/DL (ref 33–36.5)
MCV RBC AUTO: 87 FL (ref 78–98)
MONOCYTES # BLD AUTO: 0.5 X10'3 (ref 0–0.9)
MONOCYTES NFR BLD AUTO: 9.2 % (ref 2–12)
NEUTROPHILS # BLD AUTO: 4 X10'3 (ref 1.8–7.7)
NEUTROPHILS NFR BLD AUTO: 74.8 % (ref 42–75)
PHOSPHATE SERPL-MCNC: 3.4 MG/DL (ref 2.3–4.5)
PLATELET # BLD AUTO: 265 X10'3 (ref 140–440)
PMV BLD AUTO: 7.1 FL (ref 7.4–10.4)
POTASSIUM SERPL-SCNC: 3.4 MMOL/L (ref 3.5–5.1)
PROT SERPL-MCNC: 5.5 G/DL (ref 6.4–8.2)
RBC # BLD AUTO: 3.25 X10'6 (ref 4.7–6.1)
SODIUM SERPL-SCNC: 138 MMOL/L (ref 135–145)
WBC # BLD AUTO: 5.4 X10'3 (ref 4.5–11)

## 2023-02-18 RX ADMIN — Medication SCH MG: at 09:58

## 2023-02-18 RX ADMIN — DOCUSATE SODIUM SCH MG: 50 LIQUID ORAL at 09:57

## 2023-02-18 RX ADMIN — SODIUM CHLORIDE SCH MLS/HR: 9 INJECTION INTRAMUSCULAR; INTRAVENOUS; SUBCUTANEOUS at 01:15

## 2023-02-18 RX ADMIN — LANSOPRAZOLE SCH MG: 15 TABLET, ORALLY DISINTEGRATING, DELAYED RELEASE ORAL at 09:57

## 2023-02-18 RX ADMIN — POTASSIUM BICARBONATE PRN MEQ: 782 TABLET, EFFERVESCENT ORAL at 20:57

## 2023-02-18 RX ADMIN — POTASSIUM BICARBONATE PRN MEQ: 782 TABLET, EFFERVESCENT ORAL at 15:51

## 2023-02-18 RX ADMIN — POTASSIUM BICARBONATE PRN MEQ: 782 TABLET, EFFERVESCENT ORAL at 10:09

## 2023-02-18 RX ADMIN — MEMANTINE HYDROCHLORIDE SCH MG: 7 CAPSULE, EXTENDED RELEASE ORAL at 09:59

## 2023-02-18 RX ADMIN — DOCUSATE SODIUM SCH MG: 50 LIQUID ORAL at 20:56

## 2023-02-18 RX ADMIN — HEPARIN SODIUM SCH UNIT: 5000 INJECTION, SOLUTION INTRAVENOUS; SUBCUTANEOUS at 20:57

## 2023-02-18 RX ADMIN — HEPARIN SODIUM SCH UNIT: 5000 INJECTION, SOLUTION INTRAVENOUS; SUBCUTANEOUS at 09:57

## 2023-02-18 NOTE — NUR
Patient in room [facility]. I have received report from LITO Vidal and had the opportunity 
to ask questions and assume patient care.

## 2023-02-18 NOTE — NUR
Problems reprioritized. Patient report given, questions answered & plan of care reviewed 
with LITO De La Torre.

## 2023-02-19 VITALS — DIASTOLIC BLOOD PRESSURE: 45 MMHG | SYSTOLIC BLOOD PRESSURE: 92 MMHG

## 2023-02-19 VITALS — SYSTOLIC BLOOD PRESSURE: 104 MMHG | DIASTOLIC BLOOD PRESSURE: 51 MMHG

## 2023-02-19 LAB
ALBUMIN SERPL BCP-MCNC: 2.4 G/DL (ref 3.4–5)
ALBUMIN/GLOB SERPL: 0.7 {RATIO} (ref 1.1–1.5)
ALP SERPL-CCNC: 76 IU/L (ref 46–116)
ALT SERPL W P-5'-P-CCNC: 21 U/L (ref 12–78)
ANION GAP SERPL CALCULATED.3IONS-SCNC: 6 MMOL/L (ref 8–16)
AST SERPL W P-5'-P-CCNC: 24 U/L (ref 10–37)
BASOPHILS # BLD AUTO: 0 X10'3 (ref 0–0.2)
BASOPHILS NFR BLD AUTO: 0.3 % (ref 0–1)
BILIRUB SERPL-MCNC: 0.2 MG/DL (ref 0.1–1)
BUN SERPL-MCNC: 24 MG/DL (ref 7–18)
BUN/CREAT SERPL: 15 (ref 5.4–32)
CALCIUM SERPL-MCNC: 7.9 MG/DL (ref 8.5–10.1)
CHLORIDE SERPL-SCNC: 103 MMOL/L (ref 99–107)
CO2 SERPL-SCNC: 25.6 MMOL/L (ref 24–32)
CREAT SERPL-MCNC: 1.6 MG/DL (ref 0.6–1.1)
EOSINOPHIL # BLD AUTO: 0 X10'3 (ref 0–0.9)
EOSINOPHIL NFR BLD AUTO: 0.6 % (ref 0–6)
ERYTHROCYTE [DISTWIDTH] IN BLOOD BY AUTOMATED COUNT: 13.3 % (ref 11.5–14.5)
GFR SERPL CREATININE-BSD FRML MDRD: 42 ML/MIN
GLUCOSE SERPL-MCNC: 138 MG/DL (ref 70–104)
HCT VFR BLD AUTO: 27.3 % (ref 42–52)
HGB BLD-MCNC: 9.3 G/DL (ref 14–17.9)
LYMPHOCYTES # BLD AUTO: 0.3 X10'3 (ref 1.1–4.8)
LYMPHOCYTES NFR BLD AUTO: 7.8 % (ref 21–51)
MAGNESIUM SERPL-MCNC: 1.4 MG/DL (ref 1.5–2.4)
MCH RBC QN AUTO: 29.5 PG (ref 27–31)
MCHC RBC AUTO-ENTMCNC: 34 G/DL (ref 33–36.5)
MCV RBC AUTO: 86.7 FL (ref 78–98)
MONOCYTES # BLD AUTO: 0.4 X10'3 (ref 0–0.9)
MONOCYTES NFR BLD AUTO: 10 % (ref 2–12)
NEUTROPHILS # BLD AUTO: 3.6 X10'3 (ref 1.8–7.7)
NEUTROPHILS NFR BLD AUTO: 81.3 % (ref 42–75)
PHOSPHATE SERPL-MCNC: 1.9 MG/DL (ref 2.3–4.5)
PLATELET # BLD AUTO: 266 X10'3 (ref 140–440)
PMV BLD AUTO: 7.6 FL (ref 7.4–10.4)
POTASSIUM SERPL-SCNC: 4.5 MMOL/L (ref 3.5–5.1)
PROT SERPL-MCNC: 5.7 G/DL (ref 6.4–8.2)
RBC # BLD AUTO: 3.15 X10'6 (ref 4.7–6.1)
SODIUM SERPL-SCNC: 135 MMOL/L (ref 135–145)
WBC # BLD AUTO: 4.4 X10'3 (ref 4.5–11)

## 2023-02-19 RX ADMIN — HEPARIN SODIUM SCH UNIT: 5000 INJECTION, SOLUTION INTRAVENOUS; SUBCUTANEOUS at 09:34

## 2023-02-19 RX ADMIN — LANSOPRAZOLE SCH MG: 15 TABLET, ORALLY DISINTEGRATING, DELAYED RELEASE ORAL at 09:34

## 2023-02-19 RX ADMIN — MEMANTINE HYDROCHLORIDE SCH MG: 7 CAPSULE, EXTENDED RELEASE ORAL at 09:34

## 2023-02-19 RX ADMIN — Medication SCH MG: at 09:33

## 2023-02-19 RX ADMIN — MORPHINE SULFATE PRN MG: 10 SOLUTION ORAL at 19:53

## 2023-02-19 RX ADMIN — MORPHINE SULFATE PRN MG: 10 SOLUTION ORAL at 15:29

## 2023-02-19 RX ADMIN — DOCUSATE SODIUM SCH MG: 50 LIQUID ORAL at 09:33

## 2023-02-19 NOTE — NUR
Student documentation:

I have reviewed and agree with all interventions, assessments performed and documented by 
SUNITA Quach.

## 2023-02-19 NOTE — NUR
Pt comfort care, no immediate signs of distress at first assessment. Pt repositioned and 
medicated at this time. Will continue to monitor comfort level.

## 2023-02-19 NOTE — NUR
Problems reprioritized. Patient report given, questions answered & plan of care reviewed 
with LITO Ortiz.

## 2023-02-19 NOTE — NUR
Problems reprioritized. Patient report given, questions answered & plan of care reviewed 
with LITO Moran.

## 2023-02-19 NOTE — NUR
Patient in room [facility]. I have received report from LITO De La Torre and had the opportunity to 
ask questions and assume patient care.

## 2023-02-20 RX ADMIN — ATROPINE SULFATE PRN DROP: 10 SOLUTION/ DROPS OPHTHALMIC at 00:58

## 2023-02-20 RX ADMIN — MORPHINE SULFATE PRN MG: 10 SOLUTION ORAL at 09:38

## 2023-02-20 RX ADMIN — ATROPINE SULFATE PRN DROP: 10 SOLUTION/ DROPS OPHTHALMIC at 09:37

## 2023-02-20 NOTE — NUR
Per medical records, this is a 89 years old white male, with history of multiple medical 
problems including CHF ejection fraction 55% chronic, anemia hemoglobin 11.1, dyslipidemia, 
hypertension poor control, coronary artery disease status post CABG, chronic kidney disease, 
dementia, history of chronic CVA, presented to emergency department chief complaint 
generalized weakness associated with altered level of consciousness.  Labs: WBC 4.4, Hgb 
9.3, Hct 27.3, glucose 138, albumin 2.4. Comfort care only. WOC consult for Low linda 
score, R/L hip abrasion. Patient is actively dying at nurse arrival. Family at the bedside 
with Dr. Behl. He is in a position of comfort. Family asks that he not be disturbed. WOC 
will no longer follow. No skin issues noted or reported. Report off to primary nurse Nay. 
Patient comfort care only.

## 2023-02-20 NOTE — NUR
Patient in room [facility]. I have received report from MARI MORSE and had the opportunity to ask 
questions and assume patient care.

## 2023-02-20 NOTE — NUR
Reassessment: Per MD note patient's SO requested to discontinue TF. Pt now 

NPO and DNR with comfort care. Pt with a low Kris of 12, with an 

abrasion to right hip. No wound care to be provided unless it will provide 

the pt comfort per EMR. LB 2/19 per I&O. Will continue to follow per LOS.

Recommendations:

1) Bowel care per comfort care measures

-------------------------------------------------------------------------------

Addendum: 02/20/23 at 0909 by Liliam Grant RD

-------------------------------------------------------------------------------

Amended: Links added.

## 2023-02-21 VITALS — DIASTOLIC BLOOD PRESSURE: 64 MMHG | SYSTOLIC BLOOD PRESSURE: 132 MMHG

## 2023-02-21 VITALS — SYSTOLIC BLOOD PRESSURE: 129 MMHG | DIASTOLIC BLOOD PRESSURE: 54 MMHG

## 2023-02-21 RX ADMIN — ATROPINE SULFATE PRN DROP: 10 SOLUTION/ DROPS OPHTHALMIC at 11:38

## 2023-02-21 RX ADMIN — SCOLOPAMINE TRANSDERMAL SYSTEM SCH EACH: 1 PATCH, EXTENDED RELEASE TRANSDERMAL at 12:01

## 2023-02-21 RX ADMIN — MORPHINE SULFATE PRN MG: 10 SOLUTION ORAL at 04:56

## 2023-02-21 RX ADMIN — MORPHINE SULFATE PRN MG: 10 SOLUTION ORAL at 11:38

## 2023-02-21 RX ADMIN — MORPHINE SULFATE PRN MG: 10 SOLUTION ORAL at 22:58

## 2023-02-21 RX ADMIN — MORPHINE SULFATE PRN MG: 10 SOLUTION ORAL at 08:11

## 2023-02-21 RX ADMIN — ATROPINE SULFATE PRN DROP: 10 SOLUTION/ DROPS OPHTHALMIC at 04:59

## 2023-02-21 NOTE — NUR
Patient in room [facility]. I have received report from Faith Cuevas and had the opportunity to ask 
questions and assume patient care.

## 2023-02-21 NOTE — NUR
Problems reprioritized. Patient report given, questions answered & plan of care reviewed 
with ZEN MORSE.

## 2023-02-21 NOTE — NUR
Patient in room [facility]. I have received report from ZEN MORSE and had the opportunity to 
ask questions and assume patient care.

## 2023-02-22 VITALS — SYSTOLIC BLOOD PRESSURE: 117 MMHG | DIASTOLIC BLOOD PRESSURE: 54 MMHG

## 2023-02-22 RX ADMIN — MORPHINE SULFATE PRN MG: 10 SOLUTION ORAL at 09:23

## 2023-02-22 RX ADMIN — MORPHINE SULFATE PRN MG: 10 SOLUTION ORAL at 12:30

## 2023-02-23 RX ADMIN — MORPHINE SULFATE PRN MG: 10 SOLUTION ORAL at 10:24

## 2023-02-23 NOTE — NUR
Patient in room [facility]. I have received report from Amara MORSE and had the opportunity to 
ask questions and assume patient care.

## 2023-02-23 NOTE — NUR
Patient turned as breathing difficulty following admin of roxanol, observed purplish 
pressure area on left sacral area . sign on door stated per family to not turn patient, will 
connect with family with regards this as part of comfort care.  Roxanol changed to IV 
morphine per Dr graves. will continue to monitor

## 2023-02-23 NOTE — NUR
Problems reprioritized. Patient report given, questions answered & plan of care reviewed 
with JULIANNA MORSE.

## 2023-02-23 NOTE — NUR
Attempt x2 to make contact with wife linda with regards turning patient. unable to make 
contact, will continue to monitor

## 2023-02-23 NOTE — NUR
Patient in room [facility]. I have received report from JULIANNA MORSE AND SHANNON WARNER and had the 
opportunity to ask questions and assume patient care.

## 2023-02-24 RX ADMIN — SCOLOPAMINE TRANSDERMAL SYSTEM SCH EACH: 1 PATCH, EXTENDED RELEASE TRANSDERMAL at 15:10

## 2023-02-24 RX ADMIN — MORPHINE SULFATE PRN MG: 4 INJECTION, SOLUTION INTRAMUSCULAR; INTRAVENOUS at 15:17

## 2023-02-24 RX ADMIN — MORPHINE SULFATE PRN MG: 4 INJECTION, SOLUTION INTRAMUSCULAR; INTRAVENOUS at 07:07

## 2023-02-24 NOTE — NUR
entered pts room to turn for comfort. Eyes were open and pt was able to shake his head yes 
and mouth yes that he was in pain along with facial grimacing RN Rambo notified and 
administering PRN morphine.

## 2023-02-24 NOTE — NUR
LVN documentation:

I have reviewed and agree with all interventions, assessments performed and documented by 
Renu Yeager LVN.

## 2023-02-24 NOTE — NUR
Patient in room [facility]. I have received report from SHANNON WARNER and had the opportunity to 
ask questions and assume patient care.

## 2023-02-24 NOTE — NUR
Problems reprioritized. Patient report given, questions answered & plan of care reviewed 
with SHANNON WARNER.

## 2023-02-24 NOTE — NUR
Patient in room [facility]. I have received report from Karmen MORSE and had the opportunity to 
ask questions and assume patient care.

## 2023-02-25 NOTE — NUR
Patient in room [facility]. I have received report from MONICA WARNER and had the opportunity to ask 
questions and assume patient care.

## 2023-02-25 NOTE — NUR
LVN documentation:

I have reviewed and agree with all interventions, assessments performed and documented by 
Hazel Dubois LVN.

## 2023-02-25 NOTE — NUR
Problems reprioritized. Patient report given, questions answered & plan of care reviewed 
with MONICA WARNER.

## 2023-02-26 NOTE — NUR
RN IS TO DOCUMENT YES TO ALL APPLICABLE AREAS

Pronouncement of Death: YES

1. Time Physician Notified: [id number]

2. Date of Death: 2/26/23

3. Time of Death: 1035

4. DNR/Withdraw life support documented: YES

5. Monitor strip has been placed on chart: YES

6. Assessment process is of one-minute duration and includes following criteria:

    a) Patient is unresponsive to all stimuli: YES

    b) Pupils fixed and non-reactive:

    c) Auscultation of precordium reveals absence of heart tones: YES

    d) Auscultation of lungs reveals absence of breath sounds: YES

    e) Absence of blood pressure / all vital signs: YES

    f) QRS complexes are not present on monitor / EKG strip: YES

    g) Pacer spikes without capture:

4. Comments: Spoke with Wife Deepthi Cuellar verbal authorization for [facility] obtained

## 2023-02-26 NOTE — NUR
Patient  at 1035. Patient next of kin notified: wife Deepthi Cuellar. Wife gave verbal 
permission to transfer patient to [facility]. Donor network contacted, patient not 
margie. [place] contacted for patient pickup.

## 2023-02-26 NOTE — NUR
Representative for [employer] [facility] here for transport. Escorted by security. Patient and 
belongings both escorted to [place] vehicle by security.

## 2023-02-26 NOTE — NUR
Problems reprioritized. Patient report given, questions answered & plan of care reviewed 
with MONICA SILVA LVN.